# Patient Record
Sex: MALE | Race: WHITE | NOT HISPANIC OR LATINO | Employment: UNEMPLOYED | URBAN - METROPOLITAN AREA
[De-identification: names, ages, dates, MRNs, and addresses within clinical notes are randomized per-mention and may not be internally consistent; named-entity substitution may affect disease eponyms.]

---

## 2024-02-09 ENCOUNTER — NURSING HOME VISIT (OUTPATIENT)
Dept: POST ACUTE CARE | Facility: EXTERNAL LOCATION | Age: 59
End: 2024-02-09
Payer: MEDICARE

## 2024-02-09 DIAGNOSIS — D63.1 ANEMIA OF CHRONIC KIDNEY FAILURE, STAGE 3 (MODERATE) (MULTI): ICD-10-CM

## 2024-02-09 DIAGNOSIS — F41.1 GENERALIZED ANXIETY DISORDER WITH PANIC ATTACKS: ICD-10-CM

## 2024-02-09 DIAGNOSIS — F79 INTELLECTUAL DISABILITY: ICD-10-CM

## 2024-02-09 DIAGNOSIS — E11.3492: ICD-10-CM

## 2024-02-09 DIAGNOSIS — N18.30 ANEMIA OF CHRONIC KIDNEY FAILURE, STAGE 3 (MODERATE) (MULTI): ICD-10-CM

## 2024-02-09 DIAGNOSIS — I10 PRIMARY HYPERTENSION: Primary | ICD-10-CM

## 2024-02-09 DIAGNOSIS — E11.21 TYPE 2 DIABETES MELLITUS WITH NEPHROPATHY (MULTI): ICD-10-CM

## 2024-02-09 DIAGNOSIS — F33.1 MODERATE EPISODE OF RECURRENT MAJOR DEPRESSIVE DISORDER (MULTI): ICD-10-CM

## 2024-02-09 DIAGNOSIS — F41.0 GENERALIZED ANXIETY DISORDER WITH PANIC ATTACKS: ICD-10-CM

## 2024-02-09 PROCEDURE — 99309 SBSQ NF CARE MODERATE MDM 30: CPT | Performed by: INTERNAL MEDICINE

## 2024-02-09 NOTE — LETTER
"Patient: Guevara Dela Cruz  : 1965    Encounter Date: 2024    Physician progress note  Seen at [facility]  Chief complaint follow-up diabetes and other chronic medical problems    No acute problems reported by patient today  No nursing concerns    Interval events  1 continue to be followed by [facility] for right hindfoot fractures/revision surgery.  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  Now being followed by psych services.  Behaviors noted include struggling with having patients, wanting immediate gratification and attention from staff and having unrealistic expectations.  Notes indicate \"found resident in dining area screaming  obscenities at TV\" able to be redirected.   Had to be redirected out of laundry area which is off limits for resident.  #4   ophthalmology appointment  at [facility]  5 intermittent hyperglycemia with increase in Lantus  6 abrasions to left shin partial-thickness slow to heal  7 falls without injury -some during AMINATA with previous friends.      NKDA  Current medications  Oxycodone as needed  Thiamine  Lipitor  Folic acid  Multivitamin  Nystatin powder to right hand  IcyHot  Latanoprost  Remeron  Depakote  Ofloxacin eyedrops active since October  Prednisolone eyedrops active since October  Lantus 26  Venlafaxine  Humalog sliding scale  Amlodipine 2.5  Tylenol as needed  Januvia    Review of Systems   Constitutional:  Negative for activity change, appetite change, fatigue and fever.   HENT:  Negative for dental problem, hearing loss and trouble swallowing.    Eyes:  Negative for visual disturbance.   Respiratory:  Negative for cough, chest tightness and shortness of breath.    Cardiovascular:  Positive for leg swelling. Negative for chest pain and " palpitations.   Gastrointestinal:  Negative for abdominal pain, constipation, diarrhea and nausea.   Endocrine: Positive for polyphagia. Negative for cold intolerance, polydipsia and polyuria.   Genitourinary:  Positive for frequency.   Musculoskeletal:  Negative for arthralgias and joint swelling.   Skin:  Positive for wound.   Neurological:  Negative for dizziness and headaches.   Psychiatric/Behavioral:  Positive for agitation and behavioral problems. The patient is nervous/anxious and is hyperactive.      Vital signs reviewed in point click care  Systolic blood pressure between 120 and 140  Pulse 70-90  Weight 206 pounds.  Weight is widely variable 1 year ago weight was 214 pounds.  5 months ago 193.  Pounds      Physical Exam  Constitutional:       Appearance: He is not toxic-appearing.      Comments: Oddly dressed.  Scooting self in wheelchair.  Recognizes examiner by name and asks about other [facility] staff members   HENT:      Head: Normocephalic.      Mouth/Throat:      Mouth: Mucous membranes are moist.   Eyes:      Extraocular Movements: Extraocular movements intact.   Cardiovascular:      Rate and Rhythm: Normal rate and regular rhythm.   Pulmonary:      Effort: Pulmonary effort is normal.      Breath sounds: Normal breath sounds.   Abdominal:      General: Bowel sounds are normal.      Palpations: Abdomen is soft.   Musculoskeletal:      Cervical back: Normal range of motion and neck supple.      Comments: 2+ edema bilateral legs to mid shins.  Much improved compared to previous exams.  Right lower extremity now out of boot and has dressing over open abrasions   Skin:     General: Skin is warm and dry.   Neurological:      Mental Status: He is alert and oriented to person, place, and time.   Psychiatric:         Mood and Affect: Mood normal.      Comments: Poor insight noted again asks about when he will be returning home although been discussed numerous times and he is no longer able to care for himself  and this is       Labs reviewed  January 29, 2024  WBC 6  Hemoglobin 7.6  Platelet 479  Creatinine 2  Potassium 5  B12 greater than 2000  Folate greater than 20  TIBC 271  Iron 26 January 26, 2024  Hemoglobin A1c 8.1        Assessment and plan  58-year-old with multi vitamin D and advanced manifest stations of longstanding uncontrolled diabetes.  Medically stable.    Cardiovascular  1 hypertension well-controlled on low dose norvasc. Consider discontinuation given persistent lower extremity edema with trial to ACE inhibitor going forward Echocardiogram results reviewed from December 2022.  EF 70% concentric LVH.  No valve disease    Endocrine  1 diabetes mellitus type 2  With diabetic retinopathy-- last seen by [facility] November 2023.  No change to treatment plan.  Noted resolved vitreous hemorrhage, and resolved iritis.  Appears ophthalmologic medications continued including prednisolone, latanoprost, ofloxacin. Has follow-up April 7  With hyperglycemia -overall improving last hemoglobin A1c 8.1.  Continue current insulin.  Does not choose to follow a diabetic diet.  With diabetic nephropathy-  Last creatinine 2 with EGFR 38 -trending up over the past 6 months. Referral to [facility] nephrology . Once established consider referral to [facility] anemia clinic for anemia of chronic kidney disease    Psychiatric  1 current diagnosis of major depression and generalized anxiety in setting of mild intellectual disability.  Psychiatry following.  Nurses perception that behaviors usually related to anxiety and somewhat childlike approach to problems when he is asked to wait his turn.  Continue venlafaxine / remeron and Depakote.      Polypharmacy  Will discuss with primary nurse practitioners but can likely discontinue thiamine, folic acid and oxycodone    DNR Comfort Care arrest  Yadira Kaplan MD      Electronically Signed By: Yadira Kaplan MD   2/17/24  9:12 PM

## 2024-02-17 PROBLEM — I10 ESSENTIAL HYPERTENSION: Status: ACTIVE | Noted: 2024-02-17

## 2024-02-17 ASSESSMENT — ENCOUNTER SYMPTOMS
NERVOUS/ANXIOUS: 1
HYPERACTIVE: 1
ACTIVITY CHANGE: 0
POLYDIPSIA: 0
CHEST TIGHTNESS: 0
ARTHRALGIAS: 0
SHORTNESS OF BREATH: 0
ABDOMINAL PAIN: 0
CONSTIPATION: 0
FREQUENCY: 1
NAUSEA: 0
POLYPHAGIA: 1
JOINT SWELLING: 0
DIZZINESS: 0
TROUBLE SWALLOWING: 0
WOUND: 1
FATIGUE: 0
APPETITE CHANGE: 0
HEADACHES: 0
PALPITATIONS: 0
AGITATION: 1
COUGH: 0
DIARRHEA: 0
FEVER: 0

## 2024-02-17 NOTE — PROGRESS NOTES
"Physician progress note  Seen at [facility]  Chief complaint follow-up diabetes and other chronic medical problems    No acute problems reported by patient today  No nursing concerns    Interval events  1 continue to be followed by [facility] for right hindfoot fractures/revision surgery.  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  Now being followed by psych services.  Behaviors noted include struggling with having patients, wanting immediate gratification and attention from staff and having unrealistic expectations.  Notes indicate \"found resident in dining area screaming  obscenities at TV\" able to be redirected.   Had to be redirected out of laundry area which is off limits for resident.  #4   ophthalmology appointment January 22 at [facility]  5 intermittent hyperglycemia with increase in Lantus  6 abrasions to left shin partial-thickness slow to heal  7 falls without injury -some during [facility]IN[facility] with previous friends.      NKDA  Current medications  Oxycodone as needed  Thiamine  Lipitor  Folic acid  Multivitamin  Nystatin powder to right hand  IcyHot  Latanoprost  Remeron  Depakote  Ofloxacin eyedrops active since October  Prednisolone eyedrops active since October  Lantus 26  Venlafaxine  Humalog sliding scale  Amlodipine 2.5  Tylenol as needed  Januvia    Review of Systems   Constitutional:  Negative for activity change, appetite change, fatigue and fever.   HENT:  Negative for dental problem, hearing loss and trouble swallowing.    Eyes:  Negative for visual disturbance.   Respiratory:  Negative for cough, chest tightness and shortness of breath.    Cardiovascular:  Positive for leg swelling. Negative for chest pain and palpitations.   Gastrointestinal:  Negative for abdominal pain, constipation, " diarrhea and nausea.   Endocrine: Positive for polyphagia. Negative for cold intolerance, polydipsia and polyuria.   Genitourinary:  Positive for frequency.   Musculoskeletal:  Negative for arthralgias and joint swelling.   Skin:  Positive for wound.   Neurological:  Negative for dizziness and headaches.   Psychiatric/Behavioral:  Positive for agitation and behavioral problems. The patient is nervous/anxious and is hyperactive.      Vital signs reviewed in point click care  Systolic blood pressure between 120 and 140  Pulse 70-90  Weight 206 pounds.  Weight is widely variable 1 year ago weight was 214 pounds.  5 months ago 193.  Pounds      Physical Exam  Constitutional:       Appearance: He is not toxic-appearing.      Comments: Oddly dressed.  Scooting self in wheelchair.  Recognizes examiner by name and asks about other [facility] staff members   HENT:      Head: Normocephalic.      Mouth/Throat:      Mouth: Mucous membranes are moist.   Eyes:      Extraocular Movements: Extraocular movements intact.   Cardiovascular:      Rate and Rhythm: Normal rate and regular rhythm.   Pulmonary:      Effort: Pulmonary effort is normal.      Breath sounds: Normal breath sounds.   Abdominal:      General: Bowel sounds are normal.      Palpations: Abdomen is soft.   Musculoskeletal:      Cervical back: Normal range of motion and neck supple.      Comments: 2+ edema bilateral legs to mid shins.  Much improved compared to previous exams.  Right lower extremity now out of boot and has dressing over open abrasions   Skin:     General: Skin is warm and dry.   Neurological:      Mental Status: He is alert and oriented to person, place, and time.   Psychiatric:         Mood and Affect: Mood normal.      Comments: Poor insight noted again asks about when he will be returning home although been discussed numerous times and he is no longer able to care for himself and this is       Labs reviewed  January 29, 2024  WBC 6  Hemoglobin  7.6  Platelet 479  Creatinine 2  Potassium 5  B12 greater than 2000  Folate greater than 20  TIBC 271  Iron 26 January 26, 2024  Hemoglobin A1c 8.1        Assessment and plan  58-year-old with multi vitamin D and advanced manifest stations of longstanding uncontrolled diabetes.  Medically stable.    Cardiovascular  1 hypertension well-controlled on low dose norvasc. Consider discontinuation given persistent lower extremity edema with trial to ACE inhibitor going forward Echocardiogram results reviewed from December 2022.  EF 70% concentric LVH.  No valve disease    Endocrine  1 diabetes mellitus type 2  With diabetic retinopathy-- last seen by [facility] November 2023.  No change to treatment plan.  Noted resolved vitreous hemorrhage, and resolved iritis.  Appears ophthalmologic medications continued including prednisolone, latanoprost, ofloxacin. Has follow-up April 7  With hyperglycemia -overall improving last hemoglobin A1c 8.1.  Continue current insulin.  Does not choose to follow a diabetic diet.  With diabetic nephropathy-  Last creatinine 2 with EGFR 38 -trending up over the past 6 months. Referral to [facility] nephrology . Once established consider referral to [facility] anemia clinic for anemia of chronic kidney disease    Psychiatric  1 current diagnosis of major depression and generalized anxiety in setting of mild intellectual disability.  Psychiatry following.  Nurses perception that behaviors usually related to anxiety and somewhat childlike approach to problems when he is asked to wait his turn.  Continue venlafaxine / remeron and Depakote.      Polypharmacy  Will discuss with primary nurse practitioners but can likely discontinue thiamine, folic acid and oxycodone    DNR Comfort Care arrest  Yadira Kaplan MD

## 2024-04-08 ENCOUNTER — NURSING HOME VISIT (OUTPATIENT)
Dept: POST ACUTE CARE | Facility: EXTERNAL LOCATION | Age: 59
End: 2024-04-08
Payer: MEDICARE

## 2024-04-08 DIAGNOSIS — N18.30 TYPE 2 DIABETES MELLITUS WITH STAGE 3 CHRONIC KIDNEY DISEASE, WITH LONG-TERM CURRENT USE OF INSULIN, UNSPECIFIED WHETHER STAGE 3A OR 3B CKD (MULTI): ICD-10-CM

## 2024-04-08 DIAGNOSIS — E11.65 POORLY CONTROLLED TYPE 2 DIABETES MELLITUS WITH NEUROPATHY (MULTI): ICD-10-CM

## 2024-04-08 DIAGNOSIS — E11.319 DIABETIC RETINOPATHY ASSOCIATED WITH CONTROLLED TYPE 2 DIABETES MELLITUS (MULTI): ICD-10-CM

## 2024-04-08 DIAGNOSIS — E11.22 TYPE 2 DIABETES MELLITUS WITH STAGE 3 CHRONIC KIDNEY DISEASE, WITH LONG-TERM CURRENT USE OF INSULIN, UNSPECIFIED WHETHER STAGE 3A OR 3B CKD (MULTI): ICD-10-CM

## 2024-04-08 DIAGNOSIS — H33.23 BILATERAL RETINAL DETACHMENT: ICD-10-CM

## 2024-04-08 DIAGNOSIS — E11.40 POORLY CONTROLLED TYPE 2 DIABETES MELLITUS WITH NEUROPATHY (MULTI): ICD-10-CM

## 2024-04-08 DIAGNOSIS — R62.50 DEVELOPMENTAL DELAY: Primary | ICD-10-CM

## 2024-04-08 DIAGNOSIS — N18.30 STAGE 3 CHRONIC KIDNEY DISEASE, UNSPECIFIED WHETHER STAGE 3A OR 3B CKD (MULTI): ICD-10-CM

## 2024-04-08 DIAGNOSIS — Z79.4 TYPE 2 DIABETES MELLITUS WITH STAGE 3 CHRONIC KIDNEY DISEASE, WITH LONG-TERM CURRENT USE OF INSULIN, UNSPECIFIED WHETHER STAGE 3A OR 3B CKD (MULTI): ICD-10-CM

## 2024-04-08 PROCEDURE — 99309 SBSQ NF CARE MODERATE MDM 30: CPT | Performed by: INTERNAL MEDICINE

## 2024-04-08 NOTE — LETTER
Patient: Guevara Dela Cruz  : 1965    Encounter Date: 2024    Physician progress note readmission history and physical  Seen at [facility]  Hospitalized at [facility]  Readmitted 2024  Chief complaint facial edema diagnosis angioedema  Interval events/Hospital course  Sent to [facility] 2024 after presenting to the nurses station with facial edema, periorbital edema and swollen tongue.    Diagnosis angioedema secondary to lisinopril  Treated with steroids and epinephrine with resolution  Found to have hypoxia and treated for right middle lobe pneumonia  MARIA R with hyperkalemia resolved with fluid resuscitation  Of note he had not been on lisinopril at the time of angioedema rather he had a short course end of 2024 which was subsequently discontinued due to hyperkalemia.  He had no angioedema at that time.      Januvia discontinued as well    Current medications  Oxycodone as needed  Lispro sliding scale and 3 units prandial  Latanoprost  Colace  Lantus 20 prior 26 units  Thiamine  Prednisone taper  Lipitor  Folic acid  Zofran as needed  B12  Multivitamin  Aspirin  Ofloxacin  Prednisolone  Remeron  Tylenol as needed  Amlodipine 10 (previously 7.5 mg)  Depakote 125 3 times daily  Iron  Doxycycline 100 prophylactic    Januvia discontinued   Lasix 20 mg daily discontinued in hospital  Venlafaxine 37.5 nightly not continued in the hospital    Past medical history    MARIA R (acute kidney injury) Unknown  Altered mental status, unspecified altered mental status type 2021  Anemia due to acute blood loss 2022  Angioedema 2024  Anxiety 2021  Closed fracture of left tibial plateau, initial encounter 2022  Closed trimalleolar fracture of right ankle 2022  Overview  Added automatically from request for surgery [id number]  Closed trimalleolar fracture of right ankle 2022  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, subsequent encounter 3/10/2023  COVID-19 12/27/2021  Debility 12/27/2021  Depression with anxiety 3/10/2023  Dysphagia 12/27/2021  Heart murmur Unknown  History of ischemic stroke Unknown  Mixed hyperlipidemia 4/3/2024  Primary hypertension Unknown  Pulmonary emphysema, unspecified emphysema type (HCC) 4/3/2024  Severe protein-calorie malnutrition (HCC) 12/29/2021  Type 2 diabetes mellitus without complication, with long-term current use of insulin (HCC) 12/27/2021        Review of systems  Overall feeling fine  Denies chest pain denies shortness of breath  Usually scoots around facility in wheelchair  Vision is very bad  Hearing is good  No problem chewing or swallowing  No abdominal pain no nausea no vomiting no diarrhea.  Likes to order out.  Thinks he is gaining weight  Happy that his ulcers are healing and skin is looking better  Still wearing a boot for his delayed healing  Denies feeling angry.    Talks a bit about feeling sad about his mom.  Happy that his sister helps him sometimes  Denies feeling anxious or depressed.  Endorses good sleep    Physical exam  Blood pressure 124/74 pulse 80. 98% on room air  Disheveled white male no distress pleasant and cooperative able to make needs known seen initially wheeling himself around the facility in his wheelchair. No odor. Oddly dressed  Sclera clear  moist mucous membranes. No oral or tongue swelling  Hearing grossly intact bilateral  Neck supple no adenopathy  Heart regular  Lungs clear  Abdomen soft positive bowel sounds nontender  Skin with scattered excoriations bilateral upper extremities.  2+ edema BLE   Oriented to person place and situation.  Off on date.  Answers childlike and repetitive in nature at times    Labs  April 5, 2024  WBC 8  Hemoglobin 8.6  Platelets 519  Albumin 2.8  Alk phos 123  AST 21  ALT 22  Creatinine 2.17 (creatinine March 28, 2024 2.05)  Sodium 142  Potassium 4.1  Chloride 108  CO2 21  EGFR 34    Assessment and  plan  59-year-old male with developmental delay and end-stage manifestations of historically uncontrolled diabetes mellitus type 2.  Now recovering from hospitalization for angioedema-Hospital attributing this to a very delayed effect of treatment with lisinopril and Januvia however patient was not on lisionpril at the time of this episode.      Allergy immunology  1 acute episode of angioedema resolved.  ACE inhibitors already listed as allergy.  Add Januvia as allergy as well.  Pharmacy review underway for additional potential causes at the time of the episode- they have been communicating with NP directly.    Cardiovascular  1 hypertension stable on current regimen including amlodipine 10 mg.  Continue    Renal  1 chronic kidney disease stage III  due to dm2 stable following this hospitalization.  Does have recurrent problems with hyperkalemia.  Currently stable.  Continue to avoid nephrotoxic medications.  Has appointment to establish care with nephrology June 11, 2024 at [facility].  Follow labs. 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  Established at [facility] and no further interventions to preserve vision other than diabetic control recommended.  2 iritis resolved.  Can discontinue ofloxacin and prednisolone    Neuro/psych  1 major depression moderate recurrent.  Mood had been improving prior to admission.  No indication for discontinuation of treatment during hospitalization.  Resume previous venlafaxine  2 developmental delay with periodic outbursts of anger and lack of insight to cooperative  required in [facility].  Had been stable recently with Depakote and venlafaxine.  Appreciate input of psychiatry and psychology.  Nursing interventions for redirection and reassurance also successful intervention for behavioral problems.  Continue medications and nursing interventions.    Endocrine  1 diabetes  mellitus type 2 with neuropathy and hyperglycemia. difficult to control given patient's choice of nonadherence to diabetic diet.  February 19, 2024 hemoglobin A1c 7.7  Continue Lantus, lispro prandial and sliding scale  Continue efforts on education regarding diet however efforts to date have been completely unsuccessful.  Continue to monitor    Infectious disease  1 has no residual symptoms of pneumonia.  Currently discharged to the facility on doxycycline without end date.  Will discontinue    Yadira Kaplan MD      Electronically Signed By: Yadira Kaplan MD   4/24/24 11:28 AM

## 2024-04-24 PROBLEM — E11.22 TYPE 2 DIABETES MELLITUS WITH STAGE 3 CHRONIC KIDNEY DISEASE, WITH LONG-TERM CURRENT USE OF INSULIN (MULTI): Status: ACTIVE | Noted: 2024-04-24

## 2024-04-24 PROBLEM — N18.30 TYPE 2 DIABETES MELLITUS WITH STAGE 3 CHRONIC KIDNEY DISEASE, WITH LONG-TERM CURRENT USE OF INSULIN (MULTI): Status: ACTIVE | Noted: 2024-04-24

## 2024-04-24 PROBLEM — E11.40 POORLY CONTROLLED TYPE 2 DIABETES MELLITUS WITH NEUROPATHY (MULTI): Status: ACTIVE | Noted: 2024-04-24

## 2024-04-24 PROBLEM — E11.319: Status: ACTIVE | Noted: 2024-04-24

## 2024-04-24 PROBLEM — I10 ESSENTIAL HYPERTENSION: Status: RESOLVED | Noted: 2024-02-17 | Resolved: 2024-04-24

## 2024-04-24 PROBLEM — E11.65 POORLY CONTROLLED TYPE 2 DIABETES MELLITUS WITH NEUROPATHY (MULTI): Status: ACTIVE | Noted: 2024-04-24

## 2024-04-24 PROBLEM — Z79.4 TYPE 2 DIABETES MELLITUS WITH STAGE 3 CHRONIC KIDNEY DISEASE, WITH LONG-TERM CURRENT USE OF INSULIN (MULTI): Status: ACTIVE | Noted: 2024-04-24

## 2024-04-24 PROBLEM — R62.50 DEVELOPMENTAL DELAY: Status: ACTIVE | Noted: 2024-04-24

## 2024-04-24 PROBLEM — N18.30 STAGE 3 CHRONIC KIDNEY DISEASE (MULTI): Status: ACTIVE | Noted: 2024-04-24

## 2024-04-24 PROBLEM — H33.23 BILATERAL RETINAL DETACHMENT: Status: ACTIVE | Noted: 2024-04-24

## 2024-04-24 NOTE — PROGRESS NOTES
Physician progress note readmission history and physical  Seen at [facility]  Hospitalized at [facility]  Readmitted April 4, 2024  Chief complaint facial edema diagnosis angioedema  Interval events/Hospital course  Sent to [facility] April 1, 2024 after presenting to the nurses station with facial edema, periorbital edema and swollen tongue.    Diagnosis angioedema secondary to lisinopril  Treated with steroids and epinephrine with resolution  Found to have hypoxia and treated for right middle lobe pneumonia  MARIA R with hyperkalemia resolved with fluid resuscitation  Of note he had not been on lisinopril at the time of angioedema rather he had a short course end of February 2024 which was subsequently discontinued due to hyperkalemia.  He had no angioedema at that time.      Januvia discontinued as well    Current medications  Oxycodone as needed  Lispro sliding scale and 3 units prandial  Latanoprost  Colace  Lantus 20 prior 26 units  Thiamine  Prednisone taper  Lipitor  Folic acid  Zofran as needed  B12  Multivitamin  Aspirin  Ofloxacin  Prednisolone  Remeron  Tylenol as needed  Amlodipine 10 (previously 7.5 mg)  Depakote 125 3 times daily  Iron  Doxycycline 100 prophylactic    Januvia discontinued March 7  Lasix 20 mg daily discontinued in hospital  Venlafaxine 37.5 nightly not continued in the hospital    Past medical history    MARIA R (acute kidney injury) Unknown  Altered mental status, unspecified altered mental status type 12/25/2021  Anemia due to acute blood loss 12/27/2022  Angioedema 4/1/2024  Anxiety 12/27/2021  Closed fracture of left tibial plateau, initial encounter 12/22/2022  Closed trimalleolar fracture of right ankle 6/26/2022  Overview  Added automatically from request for surgery [id number]  Closed trimalleolar fracture of right ankle 6/26/2022  Closed trimalleolar fracture of right ankle with nonunion, subsequent encounter 3/10/2023  COVID-19 12/27/2021  Debility  12/27/2021  Depression with anxiety 3/10/2023  Dysphagia 12/27/2021  Heart murmur Unknown  History of ischemic stroke Unknown  Mixed hyperlipidemia 4/3/2024  Primary hypertension Unknown  Pulmonary emphysema, unspecified emphysema type (HCC) 4/3/2024  Severe protein-calorie malnutrition (HCC) 12/29/2021  Type 2 diabetes mellitus without complication, with long-term current use of insulin (HCC) 12/27/2021        Review of systems  Overall feeling fine  Denies chest pain denies shortness of breath  Usually scoots around facility in wheelchair  Vision is very bad  Hearing is good  No problem chewing or swallowing  No abdominal pain no nausea no vomiting no diarrhea.  Likes to order out.  Thinks he is gaining weight  Happy that his ulcers are healing and skin is looking better  Still wearing a boot for his delayed healing  Denies feeling angry.    Talks a bit about feeling sad about his mom.  Happy that his sister helps him sometimes  Denies feeling anxious or depressed.  Endorses good sleep    Physical exam  Blood pressure 124/74 pulse 80. 98% on room air  Disheveled white male no distress pleasant and cooperative able to make needs known seen initially wheeling himself around the facility in his wheelchair. No odor. Oddly dressed  Sclera clear  moist mucous membranes. No oral or tongue swelling  Hearing grossly intact bilateral  Neck supple no adenopathy  Heart regular  Lungs clear  Abdomen soft positive bowel sounds nontender  Skin with scattered excoriations bilateral upper extremities.  2+ edema BLE   Oriented to person place and situation.  Off on date.  Answers childlike and repetitive in nature at times    Labs  April 5, 2024  WBC 8  Hemoglobin 8.6  Platelets 519  Albumin 2.8  Alk phos 123  AST 21  ALT 22  Creatinine 2.17 (creatinine March 28, 2024 2.05)  Sodium 142  Potassium 4.1  Chloride 108  CO2 21  EGFR 34    Assessment and plan  59-year-old male with developmental delay and end-stage manifestations of  historically uncontrolled diabetes mellitus type 2.  Now recovering from hospitalization for angioedema-Hospital attributing this to a very delayed effect of treatment with lisinopril and Januvia however patient was not on lisionpril at the time of this episode.      Allergy immunology  1 acute episode of angioedema resolved.  ACE inhibitors already listed as allergy.  Add Januvia as allergy as well.  Pharmacy review underway for additional potential causes at the time of the episode- they have been communicating with NP directly.    Cardiovascular  1 hypertension stable on current regimen including amlodipine 10 mg.  Continue    Renal  1 chronic kidney disease stage III  due to dm2 stable following this hospitalization.  Does have recurrent problems with hyperkalemia.  Currently stable.  Continue to avoid nephrotoxic medications.  Has appointment to establish care with nephrology June 11, 2024 at [facility].  Follow labs. 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  Established at [facility] and no further interventions to preserve vision other than diabetic control recommended.  2 iritis resolved.  Can discontinue ofloxacin and prednisolone    Neuro/psych  1 major depression moderate recurrent.  Mood had been improving prior to admission.  No indication for discontinuation of treatment during hospitalization.  Resume previous venlafaxine  2 developmental delay with periodic outbursts of anger and lack of insight to cooperative  required in [facility].  Had been stable recently with Depakote and venlafaxine.  Appreciate input of psychiatry and psychology.  Nursing interventions for redirection and reassurance also successful intervention for behavioral problems.  Continue medications and nursing interventions.    Endocrine  1 diabetes mellitus type 2 with neuropathy and hyperglycemia. difficult to control given  patient's choice of nonadherence to diabetic diet.  February 19, 2024 hemoglobin A1c 7.7  Continue Lantus, lispro prandial and sliding scale  Continue efforts on education regarding diet however efforts to date have been completely unsuccessful.  Continue to monitor    Infectious disease  1 has no residual symptoms of pneumonia.  Currently discharged to the facility on doxycycline without end date.  Will discontinue    Yadira Kaplan MD